# Patient Record
Sex: FEMALE | Race: WHITE | NOT HISPANIC OR LATINO | Employment: FULL TIME | URBAN - METROPOLITAN AREA
[De-identification: names, ages, dates, MRNs, and addresses within clinical notes are randomized per-mention and may not be internally consistent; named-entity substitution may affect disease eponyms.]

---

## 2023-08-09 LAB — TRIGLYCERIDE (MG/DL) IN SER/PLAS: 151 MG/DL (ref 0–149)

## 2023-09-01 LAB
BACTERIA, URINE: ABNORMAL /HPF
RBC, URINE: ABNORMAL /HPF (ref 0–5)
SQUAMOUS EPITHELIAL CELLS, URINE: 16 /HPF
WBC, URINE: ABNORMAL /HPF (ref 0–5)

## 2023-09-03 LAB — URINE CULTURE: ABNORMAL

## 2024-02-12 ASSESSMENT — PATIENT GLOBAL ASSESSMENT (PGA): WHAT IS THE PGA: PATIENT GLOBAL ASSESSMENT:  2 - MILD

## 2024-02-12 ASSESSMENT — DERMATOLOGY QUALITY OF LIFE (QOL) ASSESSMENT
RATE HOW BOTHERED YOU ARE BY EFFECTS OF YOUR SKIN PROBLEMS ON YOUR ACTIVITIES (EG, GOING OUT, ACCOMPLISHING WHAT YOU WANT, WORK ACTIVITIES OR YOUR RELATIONSHIPS WITH OTHERS): 6 - ALWAYS BOTHERED
DATE THE QUALITY-OF-LIFE ASSESSMENT WAS COMPLETED: 66883
RATE HOW BOTHERED YOU ARE BY EFFECTS OF YOUR SKIN PROBLEMS ON YOUR ACTIVITIES (EG, GOING OUT, ACCOMPLISHING WHAT YOU WANT, WORK ACTIVITIES OR YOUR RELATIONSHIPS WITH OTHERS): 6 - ALWAYS BOTHERED
RATE HOW BOTHERED YOU ARE BY SYMPTOMS OF YOUR SKIN PROBLEM (EG, ITCHING, STINGING BURNING, HURTING OR SKIN IRRITATION): 6 - ALWAYS BOTHERED
RATE HOW BOTHERED YOU ARE BY SYMPTOMS OF YOUR SKIN PROBLEM (EG, ITCHING, STINGING BURNING, HURTING OR SKIN IRRITATION): 6 - ALWAYS BOTHERED
WHAT SINGLE SKIN CONDITION LISTED BELOW IS THE PATIENT ANSWERING THE QUALITY-OF-LIFE ASSESSMENT QUESTIONS ABOUT: NONE OF THE ABOVE
WHAT SINGLE SKIN CONDITION LISTED BELOW IS THE PATIENT ANSWERING THE QUALITY-OF-LIFE ASSESSMENT QUESTIONS ABOUT: NONE OF THE ABOVE
RATE HOW EMOTIONALLY BOTHERED YOU ARE BY YOUR SKIN PROBLEM (FOR EXAMPLE, WORRY, EMBARRASSMENT, FRUSTRATION): 6 - ALWAYS BOTHERED
RATE HOW EMOTIONALLY BOTHERED YOU ARE BY YOUR SKIN PROBLEM (FOR EXAMPLE, WORRY, EMBARRASSMENT, FRUSTRATION): 6 - ALWAYS BOTHERED

## 2024-02-13 ENCOUNTER — DOCUMENTATION (OUTPATIENT)
Dept: DERMATOLOGY | Facility: CLINIC | Age: 54
End: 2024-02-13
Payer: COMMERCIAL

## 2024-02-13 NOTE — PROGRESS NOTES
History of Skin cancer  History of Melanoma    1: Mild dysplastic nevus  Location: Right Lower Leg  Biopsy Date: August 9, 2023  Pathology:  [id number]  Treatment:  Monitor for recurrence

## 2024-02-14 ENCOUNTER — OFFICE VISIT (OUTPATIENT)
Dept: DERMATOLOGY | Facility: CLINIC | Age: 54
End: 2024-02-14
Payer: COMMERCIAL

## 2024-02-14 DIAGNOSIS — Z85.820 PERSONAL HISTORY OF MALIGNANT MELANOMA OF SKIN: ICD-10-CM

## 2024-02-14 DIAGNOSIS — L81.4 LENTIGO: ICD-10-CM

## 2024-02-14 DIAGNOSIS — Q82.8 HAILEY-HAILEY DISEASE: Primary | ICD-10-CM

## 2024-02-14 DIAGNOSIS — L82.1 SEBORRHEIC KERATOSIS: ICD-10-CM

## 2024-02-14 DIAGNOSIS — L90.5 SCAR CONDITIONS AND FIBROSIS OF SKIN: ICD-10-CM

## 2024-02-14 DIAGNOSIS — D22.9 MELANOCYTIC NEVUS, UNSPECIFIED LOCATION: ICD-10-CM

## 2024-02-14 DIAGNOSIS — D18.01 HEMANGIOMA OF SKIN: ICD-10-CM

## 2024-02-14 PROCEDURE — 99213 OFFICE O/P EST LOW 20 MIN: CPT | Performed by: DERMATOLOGY

## 2024-02-14 RX ORDER — ACYCLOVIR 800 MG/1
800 TABLET ORAL
COMMUNITY
Start: 2019-11-01 | End: 2024-02-14 | Stop reason: SDUPTHER

## 2024-02-14 RX ORDER — ACYCLOVIR 800 MG/1
800 TABLET ORAL 2 TIMES DAILY
Qty: 60 TABLET | Refills: 11 | Status: SHIPPED | OUTPATIENT
Start: 2024-02-14

## 2024-02-14 RX ORDER — CLOBETASOL PROPIONATE 0.5 MG/G
OINTMENT TOPICAL
COMMUNITY
Start: 2022-08-30 | End: 2024-02-14 | Stop reason: SDUPTHER

## 2024-02-14 RX ORDER — CLOBETASOL PROPIONATE 0.46 MG/ML
SOLUTION TOPICAL
COMMUNITY
Start: 2021-09-08 | End: 2024-02-14 | Stop reason: SDUPTHER

## 2024-02-14 RX ORDER — CLOBETASOL PROPIONATE 0.46 MG/ML
SOLUTION TOPICAL
Qty: 50 ML | Refills: 11 | Status: SHIPPED | OUTPATIENT
Start: 2024-02-14

## 2024-02-14 RX ORDER — CHLORHEXIDINE GLUCONATE 40 MG/ML
SOLUTION TOPICAL
COMMUNITY

## 2024-02-14 RX ORDER — ACITRETIN 25 MG/1
CAPSULE ORAL
COMMUNITY
End: 2024-02-14 | Stop reason: SDUPTHER

## 2024-02-14 RX ORDER — ACITRETIN 25 MG/1
CAPSULE ORAL
Qty: 30 CAPSULE | Refills: 11 | Status: SHIPPED | OUTPATIENT
Start: 2024-02-14

## 2024-02-14 RX ORDER — CLOBETASOL PROPIONATE 0.5 MG/G
OINTMENT TOPICAL
Qty: 60 G | Refills: 11 | Status: SHIPPED | OUTPATIENT
Start: 2024-02-14

## 2024-02-14 NOTE — PROGRESS NOTES
Subjective   Shelly Escobar is a 53 y.o. female who presents for the following: Skin Check.  Skin Cancer Screening  She has a history of minimal sun exposure. She is in the sun infrequently. She uses sunscreen daily. She reports itching. Her moles are not changing.    Spots that concern her: posterior neck (blistering, week, clobetasol ointment)  History of Skin cancer  History of Melanoma     1: Mild dysplastic nevus  Location: Right Lower Leg  Biopsy Date: August 9, 2023  Pathology:  [id number]  Treatment:  Monitor for recurrence      Objective   Well appearing patient in no apparent distress; mood and affect are within normal limits.    A full examination was performed including scalp, head, eyes, ears, nose, lips, neck, chest, axillae, abdomen, back, buttocks, bilateral upper extremities, bilateral lower extremities, hands, feet, fingers, toes, fingernails, and toenails. All findings within normal limits unless otherwise noted below.    Patient is clear on exam.       Scattered cherry-red papule(s).    All nevi were symmetric brown macules without atypia on dermoscopy.     Scattered tan macules in sun-exposed areas.    Stuck on verrucous, tan-brown papules and plaques.      Scar is well-healed without evidence of recurrence      Assessment/Plan   Manoj-manoj disease    Pt is well controlled on acitretin 25 mg daily; labs have been stable per PCP. Will continue clobetasol PRN, acyclovir, and acitretin. Pt will call with issues. Pt agrees with plan as above.       Related Medications  clobetasol (Temovate) 0.05 % ointment  1 Application twice daily x 2 weeks then as needed for flares    clobetasol (Temovate) 0.05 % external solution  3-4 drops to affected ear BID x 7 days, then use as needed for itching for 3 days    acitretin (Soriatane) 25 mg capsule  Take 1 pill by mouth daily with food.    acyclovir (Zovirax) 800 mg tablet  Take 1 tablet (800 mg) by mouth 2 times a day. For prophylaxis    Hemangioma of  skin    Melanocytic nevus, unspecified location    The ABCDEs of melanoma and warning signs of non-melanoma skin cancer were discussed with patient and patient expressed understanding. Sun protection and use of at least SPF 30 discussed with patient. Pt instructed to reapply every 2 hours.     Lentigo    The ABCDEs of melanoma and warning signs of non-melanoma skin cancer were discussed with patient and patient expressed understanding. Sun protection and use of at least SPF 30 discussed with patient. Pt instructed to reapply every 2 hours.     Seborrheic keratosis    Scar conditions and fibrosis of skin    Personal history of malignant melanoma of skin      Follow up in 1 year or sooner as needed.

## 2024-05-20 ENCOUNTER — SPECIALTY PHARMACY (OUTPATIENT)
Dept: PHARMACY | Facility: CLINIC | Age: 54
End: 2024-05-20

## 2024-06-04 ENCOUNTER — TELEPHONE (OUTPATIENT)
Dept: DERMATOLOGY | Facility: CLINIC | Age: 54
End: 2024-06-04
Payer: COMMERCIAL

## 2024-06-04 NOTE — TELEPHONE ENCOUNTER
Patient called and was concerned regarding denial letter of 5/20/24 from new insurance regarding medication Acitretin 25mg.  I informed patient that Smita Llanos MA was requested by Dr. Shah to ask [facility] ( [facility]) to assist us in resubmitting for this medication.

## 2024-06-07 ENCOUNTER — DOCUMENTATION (OUTPATIENT)
Dept: DERMATOLOGY | Facility: CLINIC | Age: 54
End: 2024-06-07
Payer: COMMERCIAL

## 2024-06-07 NOTE — PROGRESS NOTES
Per Inez Whitney with [facility], acitretin appeal approved until 5/21/2025. Patient to fill with Nicolasa Osborne.

## 2024-07-22 ENCOUNTER — APPOINTMENT (OUTPATIENT)
Dept: URBAN - METROPOLITAN AREA CLINIC 204 | Age: 54
Setting detail: DERMATOLOGY
End: 2024-07-23

## 2024-07-22 PROCEDURE — 99203 OFFICE O/P NEW LOW 30 MIN: CPT

## 2024-07-22 PROCEDURE — OTHER MIPS QUALITY: OTHER

## 2024-09-06 ENCOUNTER — OFFICE VISIT (OUTPATIENT)
Dept: PRIMARY CARE | Facility: CLINIC | Age: 54
End: 2024-09-06
Payer: COMMERCIAL

## 2024-09-06 VITALS
HEART RATE: 92 BPM | BODY MASS INDEX: 25.43 KG/M2 | TEMPERATURE: 98 F | WEIGHT: 162 LBS | RESPIRATION RATE: 16 BRPM | DIASTOLIC BLOOD PRESSURE: 70 MMHG | OXYGEN SATURATION: 99 % | SYSTOLIC BLOOD PRESSURE: 110 MMHG | HEIGHT: 67 IN

## 2024-09-06 DIAGNOSIS — B95.0 GROUP A STREPTOCOCCAL INFECTION: Primary | ICD-10-CM

## 2024-09-06 PROBLEM — Q82.8 DARIER DISEASE: Status: ACTIVE | Noted: 2024-09-06

## 2024-09-06 LAB — POC RAPID STREP: POSITIVE

## 2024-09-06 PROCEDURE — 99214 OFFICE O/P EST MOD 30 MIN: CPT

## 2024-09-06 PROCEDURE — 3008F BODY MASS INDEX DOCD: CPT

## 2024-09-06 PROCEDURE — 87880 STREP A ASSAY W/OPTIC: CPT

## 2024-09-06 RX ORDER — AMOXICILLIN 875 MG/1
875 TABLET, FILM COATED ORAL 2 TIMES DAILY
Qty: 14 TABLET | Refills: 0 | Status: SHIPPED | OUTPATIENT
Start: 2024-09-06 | End: 2024-09-13

## 2024-09-06 RX ORDER — CHLORHEXIDINE GLUCONATE 40 MG/ML
SOLUTION TOPICAL
Qty: 3785 ML | Refills: 3 | Status: SHIPPED | OUTPATIENT
Start: 2024-09-06 | End: 2024-10-06

## 2024-09-06 NOTE — PROGRESS NOTES
"Subjective   Shelly Escobar is a 53 y.o. female who presents for Sore Throat (6 days).  Currently has an outbreak of herpes zoster on L flank and vaginal area. Has not had 2nd dose of shingles vaccine. States that she was hospitalized after 1st dose.     Sore throat: Started on Sunday morning, has swollen lymph nodes on the neck, has negative home covid test, sore throat has been stable since (not improving or worsening). States that she regularly rinses her         Objective     /70 (BP Location: Left arm, Patient Position: Sitting, BP Cuff Size: Adult)   Pulse 92   Temp 36.7 °C (98 °F)   Resp 16   Ht 1.702 m (5' 7\")   Wt 73.5 kg (162 lb)   SpO2 99%   BMI 25.37 kg/m²   Physical Exam  Constitutional:       General: She is not in acute distress.     Appearance: Normal appearance. She is not ill-appearing.   HENT:      Head: Normocephalic and atraumatic.      Mouth/Throat:      Mouth: Mucous membranes are moist.      Pharynx: Oropharynx is clear. No oropharyngeal exudate.      Comments: Uvula is midline.  There is no evidence of peritonsillar abscess.  There is no obvious tonsillitis.  Tonsils are symmetric and enlarged bilaterally, this is likely her baseline.  There is no posterior pharyngeal stippling.  There is no obvious signs of herpetic stomatitis such as punched-out ulcers or vesicles.  Eyes:      Extraocular Movements: Extraocular movements intact.   Neck:      Comments: There is 1 large palpable lymph node in the right anterior neck as well as a similar 1 on the left side.  Musculoskeletal:         General: Normal range of motion.   Lymphadenopathy:      Cervical: Cervical adenopathy present.   Skin:     General: Skin is warm and dry.   Neurological:      General: No focal deficit present.      Mental Status: She is alert.   Psychiatric:         Mood and Affect: Mood normal.         Thought Content: Thought content normal.         Assessment & Plan  Group A streptococcal infection  Given her " history of repeat streptococcal pharyngitis and positive rapid strep test performed in office today, will treat with amoxicillin X 7 days.  Advised her to follow-up if symptoms worsen or are not improving.  Orders:    POCT Rapid Strep A manually resulted    amoxicillin (Amoxil) 875 mg tablet; Take 1 tablet (875 mg) by mouth 2 times a day for 7 days.             Xavi Krishna D.O.   Family Medicine PGY-2, [facility]  09/06/24

## 2024-09-06 NOTE — PATIENT INSTRUCTIONS
It was nice seeing you in the office today.  Sign up for [employer] to get results instantly.  Obtain labs/imaging as discussed during our visit.   Follow up as needed.   Call the office: [phone] for questions or concerns.  Please allow 48-72 hours for medication refills.    Xavi Krishna D.O.   Family Medicine PGY-2, [facility]  09/06/24

## 2024-09-09 ENCOUNTER — TELEPHONE (OUTPATIENT)
Dept: PRIMARY CARE | Facility: CLINIC | Age: 54
End: 2024-09-09
Payer: COMMERCIAL

## 2024-09-11 DIAGNOSIS — B95.0 GROUP A STREPTOCOCCAL INFECTION: ICD-10-CM

## 2024-09-20 ENCOUNTER — TELEPHONE (OUTPATIENT)
Dept: PRIMARY CARE | Facility: CLINIC | Age: 54
End: 2024-09-20
Payer: COMMERCIAL

## 2024-09-27 DIAGNOSIS — B95.0 GROUP A STREPTOCOCCAL INFECTION: ICD-10-CM

## 2024-09-27 DIAGNOSIS — Q82.8 HAILEY-HAILEY DISEASE: ICD-10-CM

## 2024-09-27 RX ORDER — CHLORHEXIDINE GLUCONATE 40 MG/ML
SOLUTION TOPICAL
Qty: 946 ML | Refills: 3 | Status: SHIPPED | OUTPATIENT
Start: 2024-09-27 | End: 2024-10-27

## 2024-10-01 ENCOUNTER — TELEPHONE (OUTPATIENT)
Dept: PRIMARY CARE | Facility: CLINIC | Age: 54
End: 2024-10-01
Payer: COMMERCIAL

## 2024-10-01 DIAGNOSIS — B95.0 GROUP A STREPTOCOCCAL INFECTION: Primary | ICD-10-CM

## 2024-10-01 RX ORDER — CHLORHEXIDINE GLUCONATE ORAL RINSE 1.2 MG/ML
15 SOLUTION DENTAL AS NEEDED
Qty: 120 ML | Refills: 0 | Status: SHIPPED | OUTPATIENT
Start: 2024-10-01 | End: 2024-10-15

## 2024-11-02 ENCOUNTER — OFFICE VISIT (OUTPATIENT)
Dept: URGENT CARE | Age: 54
End: 2024-11-02
Payer: COMMERCIAL

## 2024-11-02 VITALS
OXYGEN SATURATION: 96 % | DIASTOLIC BLOOD PRESSURE: 75 MMHG | SYSTOLIC BLOOD PRESSURE: 120 MMHG | RESPIRATION RATE: 16 BRPM | HEART RATE: 64 BPM

## 2024-11-02 DIAGNOSIS — Z20.2 STD EXPOSURE: Primary | ICD-10-CM

## 2024-11-02 LAB
POC BILIRUBIN, URINE: NEGATIVE
POC BLOOD, URINE: NEGATIVE
POC GLUCOSE, URINE: NEGATIVE MG/DL
POC KETONES, URINE: NEGATIVE MG/DL
POC LEUKOCYTES, URINE: NEGATIVE
POC NITRITE,URINE: NEGATIVE
POC PH, URINE: 6 PH
POC PROTEIN, URINE: NEGATIVE MG/DL
POC SPECIFIC GRAVITY, URINE: 1.02
POC UROBILINOGEN, URINE: 0.2 EU/DL
PREGNANCY TEST URINE, POC: NEGATIVE

## 2024-11-02 PROCEDURE — 87661 TRICHOMONAS VAGINALIS AMPLIF: CPT

## 2024-11-02 PROCEDURE — 87491 CHLMYD TRACH DNA AMP PROBE: CPT

## 2024-11-02 PROCEDURE — 87591 N.GONORRHOEAE DNA AMP PROB: CPT

## 2024-11-03 LAB
C TRACH RRNA SPEC QL NAA+PROBE: NEGATIVE
N GONORRHOEA DNA SPEC QL PROBE+SIG AMP: NEGATIVE
T VAGINALIS RRNA SPEC QL NAA+PROBE: NEGATIVE

## 2024-11-20 ENCOUNTER — OFFICE VISIT (OUTPATIENT)
Dept: PRIMARY CARE | Facility: CLINIC | Age: 54
End: 2024-11-20
Payer: COMMERCIAL

## 2024-11-20 ENCOUNTER — HOSPITAL ENCOUNTER (OUTPATIENT)
Dept: RADIOLOGY | Facility: CLINIC | Age: 54
Discharge: HOME | End: 2024-11-20
Payer: COMMERCIAL

## 2024-11-20 VITALS
TEMPERATURE: 97.2 F | DIASTOLIC BLOOD PRESSURE: 78 MMHG | BODY MASS INDEX: 25.76 KG/M2 | WEIGHT: 164.5 LBS | SYSTOLIC BLOOD PRESSURE: 115 MMHG | HEART RATE: 76 BPM | RESPIRATION RATE: 16 BRPM | OXYGEN SATURATION: 97 %

## 2024-11-20 DIAGNOSIS — J40 BRONCHITIS: Primary | ICD-10-CM

## 2024-11-20 DIAGNOSIS — J40 BRONCHITIS: ICD-10-CM

## 2024-11-20 PROBLEM — M81.0 OSTEOPOROSIS: Status: ACTIVE | Noted: 2024-11-20

## 2024-11-20 PROBLEM — N32.81 OAB (OVERACTIVE BLADDER): Status: ACTIVE | Noted: 2024-11-20

## 2024-11-20 PROBLEM — N81.6 HERNIATION OF RECTUM INTO VAGINA: Status: ACTIVE | Noted: 2024-11-20

## 2024-11-20 PROBLEM — J45.909 AIRWAY HYPERREACTIVITY (HHS-HCC): Status: ACTIVE | Noted: 2024-11-20

## 2024-11-20 PROCEDURE — 71046 X-RAY EXAM CHEST 2 VIEWS: CPT | Performed by: RADIOLOGY

## 2024-11-20 PROCEDURE — 99214 OFFICE O/P EST MOD 30 MIN: CPT

## 2024-11-20 PROCEDURE — 71046 X-RAY EXAM CHEST 2 VIEWS: CPT

## 2024-11-20 RX ORDER — PREDNISONE 20 MG/1
20 TABLET ORAL DAILY
Qty: 7 TABLET | Refills: 0 | Status: SHIPPED | OUTPATIENT
Start: 2024-11-20 | End: 2024-11-27

## 2024-11-20 RX ORDER — AZITHROMYCIN 250 MG/1
TABLET, FILM COATED ORAL
Qty: 6 TABLET | Refills: 0 | Status: SHIPPED | OUTPATIENT
Start: 2024-11-20 | End: 2024-11-25

## 2024-11-20 NOTE — PATIENT INSTRUCTIONS
It was nice seeing you in the office today.  Sign up for [employer] to get results instantly.  Obtain labs/imaging as discussed during our visit.   Follow up 3 months for 2025 annual.   Call the office: [phone] for questions or concerns.  Please allow 48-72 hours for medication refills.    Xavi Krishna D.O.   Family Medicine PGY-2, [facility]  11/20/24

## 2024-11-20 NOTE — PROGRESS NOTES
Subjective   Shelly Escobar is a 53 y.o. female who presents for Bronchitis (Pt here today for bronchitis. Pt stated that she's had for about a month and is requesting a CXR. Pt also stated that she is light-headed as well.).  Started about 1 month ago with a bad cough. No other Sx, but persistent. Worst at night. No fevers or chills. Was seen at urgent care once without significant intervention or CXR.     Still able to drink and swallow, but has a lot of coughing after.     Has known Hx of multiple bouts of PNA. She reports about 20 or so throughout her life, some of which required hospitalization.         Objective     /78 (BP Location: Right arm, Patient Position: Sitting)   Pulse 76   Temp 36.2 °C (97.2 °F) (Temporal)   Resp 16   Wt 74.6 kg (164 lb 8 oz)   SpO2 97%   BMI 25.76 kg/m²   Physical Exam  Constitutional:       Appearance: Normal appearance.   HENT:      Head: Normocephalic and atraumatic.   Eyes:      Extraocular Movements: Extraocular movements intact.   Cardiovascular:      Rate and Rhythm: Normal rate and regular rhythm.   Pulmonary:      Effort: Pulmonary effort is normal. No respiratory distress.      Breath sounds: Normal breath sounds.   Skin:     General: Skin is warm and dry.   Neurological:      General: No focal deficit present.      Mental Status: She is alert. Mental status is at baseline.   Psychiatric:         Mood and Affect: Mood normal.         Thought Content: Thought content normal.         Assessment & Plan  Bronchitis  -See history above, longstanding history of multiple bouts of pneumonia  -No reported asthma or COPD or other lung issues  -This cough is been ongoing for about 4 to 5 weeks, subacute  -I suspect that all of her symptoms may be due to a subacute bronchitis.  However, given her history and the rising prevalence of walking pneumonia currently, will treat empirically with azithromycin and obtain chest x-ray.  -Will also treat empirically for bronchitis  with 20 mg prednisone x 7 days  -ER precautions and return precautions discussed.  May broaden antibiotic therapy based on x-ray results  Orders:    azithromycin (Zithromax) 250 mg tablet; Take 2 tablets (500 mg) by mouth once daily for 1 day, THEN 1 tablet (250 mg) once daily for 4 days. Take 2 tabs (500 mg) by mouth today, than 1 daily for 4 days..    predniSONE (Deltasone) 20 mg tablet; Take 1 tablet (20 mg) by mouth once daily for 7 days.    Follow Up In [facility] - PCP - Established; Future    XR chest 2 views; Future      Fu 3 months for 2025 annual or sooner if needed.         Xavi Krishna D.O.   Family Medicine PGY-2, [facility]  11/20/24

## 2024-11-20 NOTE — PROGRESS NOTES
I reviewed with the resident the medical history and the resident’s findings on physical examination.  I discussed with the resident the patient’s diagnosis and concur with the treatment plan as documented in the resident note.     Vidal Mendenhall, DO

## 2024-12-10 ENCOUNTER — OFFICE VISIT (OUTPATIENT)
Dept: PRIMARY CARE | Facility: CLINIC | Age: 54
End: 2024-12-10
Payer: COMMERCIAL

## 2024-12-10 ENCOUNTER — HOSPITAL ENCOUNTER (EMERGENCY)
Facility: HOSPITAL | Age: 54
Discharge: ANOTHER HOSPITAL | End: 2024-12-10
Attending: EMERGENCY MEDICINE
Payer: COMMERCIAL

## 2024-12-10 ENCOUNTER — HOSPITAL ENCOUNTER (EMERGENCY)
Facility: HOSPITAL | Age: 54
Discharge: HOME | End: 2024-12-10
Attending: EMERGENCY MEDICINE
Payer: COMMERCIAL

## 2024-12-10 VITALS
HEART RATE: 71 BPM | HEIGHT: 66 IN | DIASTOLIC BLOOD PRESSURE: 81 MMHG | RESPIRATION RATE: 18 BRPM | OXYGEN SATURATION: 98 % | SYSTOLIC BLOOD PRESSURE: 121 MMHG | TEMPERATURE: 97.9 F | BODY MASS INDEX: 26.26 KG/M2 | WEIGHT: 163.4 LBS

## 2024-12-10 VITALS
DIASTOLIC BLOOD PRESSURE: 80 MMHG | BODY MASS INDEX: 25.55 KG/M2 | HEART RATE: 86 BPM | OXYGEN SATURATION: 100 % | TEMPERATURE: 96.8 F | WEIGHT: 159 LBS | SYSTOLIC BLOOD PRESSURE: 121 MMHG | HEIGHT: 66 IN | RESPIRATION RATE: 18 BRPM

## 2024-12-10 VITALS
DIASTOLIC BLOOD PRESSURE: 81 MMHG | HEIGHT: 66 IN | RESPIRATION RATE: 16 BRPM | SYSTOLIC BLOOD PRESSURE: 115 MMHG | BODY MASS INDEX: 26.26 KG/M2 | HEART RATE: 85 BPM | WEIGHT: 163.4 LBS | TEMPERATURE: 98 F | OXYGEN SATURATION: 93 %

## 2024-12-10 DIAGNOSIS — B02.30 ZOSTER OCULAR DISEASE, UNSPECIFIED: ICD-10-CM

## 2024-12-10 DIAGNOSIS — H10.33 ACUTE CONJUNCTIVITIS OF BOTH EYES, UNSPECIFIED ACUTE CONJUNCTIVITIS TYPE: Primary | ICD-10-CM

## 2024-12-10 DIAGNOSIS — H57.89 REDNESS OF BOTH EYES: Primary | ICD-10-CM

## 2024-12-10 DIAGNOSIS — H57.89 SCLERAL INJECTION: Primary | ICD-10-CM

## 2024-12-10 DIAGNOSIS — H57.13 PAIN OF BOTH EYES: ICD-10-CM

## 2024-12-10 PROCEDURE — 99284 EMERGENCY DEPT VISIT MOD MDM: CPT | Performed by: EMERGENCY MEDICINE

## 2024-12-10 PROCEDURE — 3008F BODY MASS INDEX DOCD: CPT

## 2024-12-10 PROCEDURE — 99214 OFFICE O/P EST MOD 30 MIN: CPT

## 2024-12-10 PROCEDURE — 2500000005 HC RX 250 GENERAL PHARMACY W/O HCPCS

## 2024-12-10 PROCEDURE — 99285 EMERGENCY DEPT VISIT HI MDM: CPT | Performed by: EMERGENCY MEDICINE

## 2024-12-10 PROCEDURE — 99283 EMERGENCY DEPT VISIT LOW MDM: CPT | Performed by: EMERGENCY MEDICINE

## 2024-12-10 RX ORDER — TETRACAINE HYDROCHLORIDE 5 MG/ML
1 SOLUTION OPHTHALMIC ONCE
Status: COMPLETED | OUTPATIENT
Start: 2024-12-10 | End: 2024-12-10

## 2024-12-10 RX ORDER — MOXIFLOXACIN 5 MG/ML
1 SOLUTION/ DROPS OPHTHALMIC 4 TIMES DAILY
Qty: 3 ML | Refills: 0 | Status: SHIPPED | OUTPATIENT
Start: 2024-12-10 | End: 2024-12-15

## 2024-12-10 ASSESSMENT — LIFESTYLE VARIABLES
HAVE PEOPLE ANNOYED YOU BY CRITICIZING YOUR DRINKING: NO
HAVE YOU EVER FELT YOU SHOULD CUT DOWN ON YOUR DRINKING: NO
EVER HAD A DRINK FIRST THING IN THE MORNING TO STEADY YOUR NERVES TO GET RID OF A HANGOVER: NO
EVER FELT BAD OR GUILTY ABOUT YOUR DRINKING: NO
TOTAL SCORE: 0

## 2024-12-10 ASSESSMENT — PAIN SCALES - GENERAL
PAINLEVEL_OUTOF10: 8
PAINLEVEL_OUTOF10: 6
PAINLEVEL_OUTOF10: 8
PAINLEVEL_OUTOF10: 8

## 2024-12-10 ASSESSMENT — ENCOUNTER SYMPTOMS
UNEXPECTED WEIGHT CHANGE: 0
PHOTOPHOBIA: 0
RHINORRHEA: 0
SORE THROAT: 0
NUMBNESS: 0
WHEEZING: 0
TREMORS: 0
ACTIVITY CHANGE: 0
EYE PAIN: 1
HEADACHES: 0
PALPITATIONS: 0
ARTHRALGIAS: 0
DIARRHEA: 0
CHEST TIGHTNESS: 0
WOUND: 0
APPETITE CHANGE: 0
NECK STIFFNESS: 0
SINUS PAIN: 0
CHILLS: 0
ABDOMINAL DISTENTION: 0
NECK PAIN: 0
VOMITING: 0
SEIZURES: 0
CONSTIPATION: 0
MYALGIAS: 0
EYE ITCHING: 1
COUGH: 0
BACK PAIN: 0
LIGHT-HEADEDNESS: 0
FEVER: 0
EYE REDNESS: 1
STRIDOR: 0
ABDOMINAL PAIN: 0
SINUS PRESSURE: 0
NAUSEA: 0
FATIGUE: 0
DYSURIA: 0
HEMATURIA: 0
FACIAL SWELLING: 0
FREQUENCY: 0
SHORTNESS OF BREATH: 0
DIZZINESS: 0

## 2024-12-10 ASSESSMENT — VISUAL ACUITY
OU: 20/20
OS: 20/25
OU: 1
OD: 20/20
OU: 20/50
OD: 20/50
OS: 20/70

## 2024-12-10 ASSESSMENT — PAIN DESCRIPTION - LOCATION
LOCATION: EYE
LOCATION: EYE

## 2024-12-10 ASSESSMENT — COLUMBIA-SUICIDE SEVERITY RATING SCALE - C-SSRS
2. HAVE YOU ACTUALLY HAD ANY THOUGHTS OF KILLING YOURSELF?: NO
2. HAVE YOU ACTUALLY HAD ANY THOUGHTS OF KILLING YOURSELF?: NO
6. HAVE YOU EVER DONE ANYTHING, STARTED TO DO ANYTHING, OR PREPARED TO DO ANYTHING TO END YOUR LIFE?: NO
1. IN THE PAST MONTH, HAVE YOU WISHED YOU WERE DEAD OR WISHED YOU COULD GO TO SLEEP AND NOT WAKE UP?: NO
6. HAVE YOU EVER DONE ANYTHING, STARTED TO DO ANYTHING, OR PREPARED TO DO ANYTHING TO END YOUR LIFE?: NO
1. IN THE PAST MONTH, HAVE YOU WISHED YOU WERE DEAD OR WISHED YOU COULD GO TO SLEEP AND NOT WAKE UP?: NO

## 2024-12-10 ASSESSMENT — PAIN DESCRIPTION - PAIN TYPE
TYPE: ACUTE PAIN
TYPE: ACUTE PAIN

## 2024-12-10 ASSESSMENT — PAIN DESCRIPTION - ORIENTATION
ORIENTATION: RIGHT;LEFT
ORIENTATION: RIGHT;LEFT

## 2024-12-10 ASSESSMENT — PAIN DESCRIPTION - DESCRIPTORS: DESCRIPTORS: DISCOMFORT

## 2024-12-10 ASSESSMENT — PAIN - FUNCTIONAL ASSESSMENT
PAIN_FUNCTIONAL_ASSESSMENT: 0-10
PAIN_FUNCTIONAL_ASSESSMENT: 0-10

## 2024-12-10 ASSESSMENT — PAIN DESCRIPTION - PROGRESSION: CLINICAL_PROGRESSION: NOT CHANGED

## 2024-12-10 NOTE — ED PROVIDER NOTES
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  Patient has had herpes zoster abdominis ophthalmicus in the past and reports her symptoms feel exactly the same.  She is having bilateral eye redness and pain since Saturday.  Symptoms are getting progressively worse.  Patient reports her vision is starting to worsen.  She is having bilateral blurry vision.  Visual acuity at [place] was 20/20.  Thompson lamp exam showed no corneal abrasions negative Sascha signs.  Eye pressure at [place] was 16 to the right eye and 15 to the left eye.  Denies fever, chills, nausea, vomiting, shortness of breath.  Denies lesions around body.      Patient History   Past Medical History:   Diagnosis Date    Personal history of (healed) traumatic fracture 2022    History of fracture of vertebra    Personal history of malignant melanoma of skin 2022    History of malignant melanoma    Personal history of malignant neoplasm of breast 2022    History of malignant neoplasm of breast     Past Surgical History:   Procedure Laterality Date    OTHER SURGICAL HISTORY  2019    Hysterectomy    OTHER SURGICAL HISTORY  2019    Vaginal sling procedure    OTHER SURGICAL HISTORY  2022    Lumpectomy    OTHER SURGICAL HISTORY  2019    Rectocele repair     No family history on file.  Social History     Tobacco Use    Smoking status: Former     Current packs/day: 0.00     Types: Cigarettes     Quit date: 2024     Years since quittin.4    Smokeless tobacco: Never   Vaping Use    Vaping status: Never Used   Substance Use Topics    Alcohol use: Not on file    Drug use: Never       Physical Exam   ED Triage Vitals [12/10/24 1748]   Temperature Heart Rate  Respirations BP   36 °C (96.8 °F) 86 18 121/80      Pulse Ox Temp src Heart Rate Source Patient Position   100 % -- -- Sitting      BP Location FiO2 (%)     Left arm --       Physical Exam  Vitals reviewed.   Constitutional:       General: She is not in acute distress.     Appearance: Normal appearance. She is not ill-appearing.   HENT:      Head: Normocephalic and atraumatic.      Nose: No congestion or rhinorrhea.   Eyes:      General: Vision grossly intact. Gaze aligned appropriately.         Right eye: No foreign body, discharge or hordeolum.         Left eye: No foreign body, discharge or hordeolum.      Extraocular Movements: Extraocular movements intact.      Right eye: No nystagmus.      Left eye: No nystagmus.      Conjunctiva/sclera:      Right eye: Right conjunctiva is injected. No chemosis, exudate or hemorrhage.     Left eye: Left conjunctiva is injected. No chemosis, exudate or hemorrhage.     Pupils: Pupils are equal, round, and reactive to light.   Cardiovascular:      Rate and Rhythm: Normal rate.      Pulses: Normal pulses.   Pulmonary:      Effort: Pulmonary effort is normal.   Skin:     General: Skin is warm and dry.      Capillary Refill: Capillary refill takes less than 2 seconds.      Coloration: Skin is not jaundiced or pale.      Findings: No lesion or rash.   Neurological:      General: No focal deficit present.      Mental Status: She is alert and oriented to person, place, and time.   Psychiatric:         Mood and Affect: Mood normal.         Behavior: Behavior normal.           ED Course & MDM   ED Course as of 12/10/24 2032   Tue Dec 10, 2024   1850 Ophthalmology is aware of patient. [HK]   1850 Visual acuity is 20/50 bilaterally [HK]   2027 Ophthalmology recommendations to discharge patient on artificial tears and Vigamox with follow-up in their clinic.  No concern for herpes ophthalmicus.   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  On exam patient is well-appearing and in no acute distress.  Vital signs are stable. Physical exam significant for bilateral eye redness.  PERRLA.  EOM intact.  Visual acuity grossly intact.  20/20 at [place] and 20/50 on her evaluation here.  Exam is subjective therefore will await ophthalmology exam before diagnosing worsening vision.  Negative Valero sign. Differential includes but is not limited to conjunctivitis, herpes zoster ophthalmicus, dry eye, optic neuritis. Ophthalmology was consulted immediately after my exam.  Patient staffed with ED attending physician.     Social determinants of health affecting care:  None     Patient was informed of the aforementioned findings.  Ophthalmology reported low suspicion for herpes zoster ophthalmicus or ocular inflammation based on their exam.  They recommended patient to be discharged and follow-up with their clinic. Patient will be prescribed Vigamox and artificial tears per ophtho recommendations.     At this time, low suspicion for an acute process that would necessitate further emergent workup, urgent specialist consultation, or hospitalization.  Based on clinical workup and discussion with attending physician, the disposition of discharge is appropriate.  Advised patient to pursue close follow-up with PCP.  Patient was provided with appropriate information to assist in obtaining the proper follow-up.  Discussed strict return to ED protocols with patient, including low threshold to return for changing/worsening symptoms.  Patient  demonstrated understanding and agreement with the plan of care, and all questions answered prior to discharge.  Patient stable at time of discharge.     --------------------------------------------------------------------------------------------------------------------------------------------------------------------------------------------------------------------------    This note was dictated using a speech recognition program.  While an attempt was made at proof reading to minimize errors, minor errors in transcription may be present call for questions.     Procedure  Procedures     Rita Aragon PA-C  12/10/24 2032       Rita Aragon PA-C  12/10/24 2033

## 2024-12-10 NOTE — DISCHARGE INSTRUCTIONS
Go to [facility] emergency room, let them know you are excepted as a transfer for ophthalmology evaluation.  They are aware that you are riding.

## 2024-12-10 NOTE — PROGRESS NOTES
Subjective   Patient ID: Shelly Escobar is a 53 y.o. female who presents for Allergic Reaction (Both eyes ).    Patient reports bilateral eye redness, pruritus, and pain for the past 4 days.  She has tried Benadryl and allergy eyedrops without success.  Patient states this feels similar to zoster ophthalmicus which she was previously hospitalized for several years ago.  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  She reports associated headache and nausea, but denies fever/chills, vomiting, diarrhea, cough, or any other sick symptom.  She did take an increased treatment dose of acyclovir beginning yesterday as is her normal protocol, however this has not made any difference.  Also of note, patient has been ill over the past month or so, first with strep pharyngitis and then secondly with bronchitis.      Review of Systems   Constitutional:  Negative for activity change, appetite change, chills, fatigue, fever and unexpected weight change.   HENT:  Negative for dental problem, ear pain, facial swelling, rhinorrhea, sinus pressure, sinus pain, sneezing, sore throat and tinnitus.    Eyes:  Positive for pain, redness and itching. Negative for photophobia and visual disturbance.   Respiratory:  Negative for cough, chest tightness, shortness of breath, wheezing and stridor.    Cardiovascular:  Negative for chest pain, palpitations and leg swelling.   Gastrointestinal:  Negative for abdominal distention, abdominal pain, constipation, diarrhea, nausea and vomiting.   Genitourinary:  Negative for decreased urine volume, dysuria, enuresis, frequency, hematuria and urgency.   Musculoskeletal:  Negative for arthralgias, back pain, myalgias, neck pain and neck stiffness.   Skin:  Negative for pallor, rash and wound.   Neurological:  Negative for dizziness, tremors, seizures, light-headedness, numbness and headaches.       Objective   /81 (BP Location: Right arm, Patient Position: Sitting,  "BP Cuff Size: Large adult)   Pulse 85   Temp 36.7 °C (98 °F) (Tympanic)   Resp 16   Ht 1.676 m (5' 6\")   Wt 74.1 kg (163 lb 6.4 oz)   SpO2 93%   BMI 26.37 kg/m²     Physical Exam  Vitals and nursing note reviewed.   Constitutional:       General: She is not in acute distress.     Appearance: Normal appearance. She is not ill-appearing or toxic-appearing.   HENT:      Head: Normocephalic and atraumatic.      Right Ear: External ear normal.      Left Ear: External ear normal.      Nose: Nose normal.      Mouth/Throat:      Mouth: Mucous membranes are moist.   Eyes:      General: Lids are normal.      Extraocular Movements: Extraocular movements intact.      Conjunctiva/sclera:      Right eye: Right conjunctiva is injected. No exudate or hemorrhage.     Left eye: Left conjunctiva is injected. No exudate or hemorrhage.  Cardiovascular:      Rate and Rhythm: Normal rate and regular rhythm.   Pulmonary:      Effort: Pulmonary effort is normal. No respiratory distress.      Breath sounds: Normal breath sounds. No stridor. No wheezing or rhonchi.   Abdominal:      General: Abdomen is flat.      Tenderness: There is no abdominal tenderness. There is no guarding or rebound.   Musculoskeletal:         General: Normal range of motion.   Skin:     General: Skin is warm and dry.      Findings: No rash.   Neurological:      General: No focal deficit present.      Mental Status: She is alert and oriented to person, place, and time.         Assessment/Plan   Problem List Items Addressed This Visit    None  Visit Diagnoses         Codes    Scleral injection    -  Primary H57.89    Zoster ocular disease, unspecified     B02.30          While bilateral zoster ophthalmicus seems unlikely, there is significant risk for morbidity if this is the case.  This was discussed with the patient, recommending transfer to the ED for urgent ophthalmology evaluation.    Discussed with attending physician Dr. Mendenhall.     Jesus Pascual DO     "

## 2024-12-10 NOTE — ED PROVIDER NOTES
"HPI   Chief Complaint   Patient presents with    Eye Problem     Possible \"shingles\" in both eyes.  Sent from PCP       53-year-old female presents emergency room for bilateral eye redness, onset Saturday getting progressively worse, says she feels a severe pain behind both of her eyes.  Has a history of herpes zoster ophthalmicus, says that this feels exactly like it, she was sent in for IV acyclovir by her PCP.  She does take 800 mg twice a day of oral acyclovir for prophylaxis.  She said this feels like what happens when she gets her flareups.  He says she has no cutaneous involvement today.      History provided by:  Patient          Patient History   Past Medical History:   Diagnosis Date    Personal history of (healed) traumatic fracture 2022    History of fracture of vertebra    Personal history of malignant melanoma of skin 2022    History of malignant melanoma    Personal history of malignant neoplasm of breast 2022    History of malignant neoplasm of breast     Past Surgical History:   Procedure Laterality Date    OTHER SURGICAL HISTORY  2019    Hysterectomy    OTHER SURGICAL HISTORY  2019    Vaginal sling procedure    OTHER SURGICAL HISTORY  2022    Lumpectomy    OTHER SURGICAL HISTORY  2019    Rectocele repair     No family history on file.  Social History     Tobacco Use    Smoking status: Former     Current packs/day: 0.00     Types: Cigarettes     Quit date: 2024     Years since quittin.4    Smokeless tobacco: Never   Vaping Use    Vaping status: Never Used   Substance Use Topics    Alcohol use: Not on file    Drug use: Never       Physical Exam   ED Triage Vitals [12/10/24 1253]   Temperature Heart Rate Respirations BP   36.6 °C (97.9 °F) 74 16 113/71      Pulse Ox Temp Source Heart Rate Source Patient Position   100 % Temporal Monitor Sitting      BP Location FiO2 (%)     Right arm --       Physical Exam  Vitals and nursing note reviewed. "   Constitutional:       General: She is not in acute distress.  HENT:      Head: Normocephalic and atraumatic.   Eyes:      General: No scleral icterus.        Right eye: No discharge.         Left eye: No discharge.      Extraocular Movements: Extraocular movements intact.      Pupils: Pupils are equal, round, and reactive to light.      Comments: Bilateral conjunctival injection.  No pain with extraocular movements, no preseptal swelling.   Cardiovascular:      Rate and Rhythm: Normal rate and regular rhythm.      Pulses: Normal pulses.   Pulmonary:      Effort: Pulmonary effort is normal.   Abdominal:      General: Abdomen is flat.      Palpations: Abdomen is soft.      Tenderness: There is no abdominal tenderness. There is no guarding or rebound.   Musculoskeletal:         General: No deformity.      Right lower leg: No edema.      Left lower leg: No edema.   Skin:     General: Skin is warm and dry.   Neurological:      Mental Status: She is alert and oriented to person, place, and time. Mental status is at baseline.   Psychiatric:         Mood and Affect: Mood normal.         Behavior: Behavior normal.           ED Course & MDM   ED Course as of 12/10/24 1532   e Dec 10, 2024   1427 Woods lamp exam shows no corneal abrasions, negative Sascha sign.  Right eye pressure 16, left eye pressure 15 [RD]   1522 Ophthalmology accepted patient for transfer downto ED to evaluate [RD]      ED Course User Index  [RD] Culeln Thakkar DO         Diagnoses as of 12/10/24 1532   Acute conjunctivitis of both eyes, unspecified acute conjunctivitis type                 No data recorded     Mary Jo Coma Scale Score: 15 (12/10/24 1255 : Fallon Mayorga RN)                           Medical Decision Making  53 female comes emergency for bilateral conjunctivitis, has a history of herpes zoster ophthalmology gets.  Was lamp showed no corneal abrasion, negative Sascha sign, right eye pressure was 16, left eye pressure was 15.  Spoke  with ophthalmology, patient was [place] by [place].        Procedure  Procedures     Cullen Thakkar DO  Resident  12/10/24 0599

## 2024-12-10 NOTE — PROGRESS NOTES
Transfer Center/Expected Patient     Transferring Facility [facility]    Reason for Transfer [facility]oster Opthal[facility]    Accepting Service Op[facility]    Additional Work Up/Orders Needed: Optho Eval

## 2024-12-11 NOTE — CONSULTS
History of Present Illness:     Shelly Escobar is a 53 y.o. 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    Patient states that her symptoms began 4 days ago with initial sx of b/l eye redness. Notes that she has been sick for the past 1-2 months with bronchitis. Her PCP advised her to present to ED given her immunocompromised status in setting of Darier's disease, and out of concern for herpes zoster reactivation. Patient reports issues with eye redness, foreign body sensation, watering and some discharge. She has tried artificial tears without much improvement per pt.     She reports prior history of HZO but that this was many years ago and she has since been on acyclovir 800mg BID ppx. Denies any prior ocular surgeries, medications, diagnoses. She follows with a private optometrist for her routine care.    PMHx:   Past Medical History:   Diagnosis Date    Personal history of (healed) traumatic fracture 08/01/2022    History of fracture of vertebra    Personal history of malignant melanoma of skin 08/01/2022    History of malignant melanoma    Personal history of malignant neoplasm of breast 08/01/2022    History of malignant neoplasm of breast     Medications: As per Medication List  Allergies: Valacyclovir and Latex  Past Ocular History: as per above HPI    Physical Examination:     Slit Lamp and Fundus Exam       External Exam         Right Left    External Normal Normal              Slit Lamp Exam         Right Left    Lids/Lashes Normal Normal    Conjunctiva/Sclera 1+ Injection, mild discharge noted 1+ Injection, mild discharge noted    Cornea 1+ SPK inf Clear    Anterior Chamber Deep and quiet Deep and quiet    Iris Round and reactive Round and reactive    Lens Trace Nuclear sclerosis Trace Nuclear sclerosis    Anterior Vitreous Vitreous syneresis Vitreous syneresis              Fundus Exam         Right  Left    Disc Normal Normal    C/D Ratio 0.3 0.2    Macula Flat, intact Flat, intact    Vessels Normal course and caliber Normal course and caliber    Periphery No tears, breaks, or holes No tears, breaks, or holes                      Assessment and Plan:    #Acute conjunctivitis both eyes  - Pt presents with redness/itching/watering of both eyes x 4 days. Also with some purulent discharge reported by patient, trace amount noted on exam.  - There is no concern for HZO at this time - no vesicular rash, no corneal involvement, no anterior chamber inflammation, posterior segment exam wnl.   - Conjunctivitis is most likely viral in etiology however given discharge seen, will add on topical abx in addition to supportive therapy as below.    - Start artifiical tears QID both eyes  - Start Vigamox QID both eyes x 5 days  - Frequent hand washing advised, cleaning of home surfaces  - Can use cool compresses for symptomatic relief    Recommend follow-up with  [facility], within 2-3 weeks. Please call [phone] (EYES) to make appointment.    Faisal Noble MD  Ophthalmology PGY2      Ophthalmology Adult Pager - [phone]  Ophthalmology Pediatrics Pager - [phone]    For adult follow-up appointments, call: [phone]  For pediatric follow-up appointments, call: [phone]    Brief Key of Common Ophthalmology Abbreviations:  OD: right eye  OS: left eye  OU: both eyes  VA: visual acuity   IOP: intraocular pressure  EOMs: extraocular movements  CVF: confrontal visual fields  DFE: dilated fundus exam  DBH: dot blot hemorrhage  CWS: cotton wool spot  AC: anterior chamber  RD: retinal detachment  PVD: posterior vitreous detachment    NOTE: This note is not finalized until attending reviews and signs.

## 2024-12-11 NOTE — DISCHARGE INSTRUCTIONS
You were seen in the emergency department for evaluation of redness and eye pain of both eyes.    Your exam showed no concern for herpes zoster    You were prescribed Vigamox and artificial tears.  Ophthalmology will contact you to set up a follow-up appointment in the next couple of weeks.    Please follow-up with your primary care provider.  If you do not have a primary care provider you can call [phone] to make an appointment.    Please do not hesitate to return to the ED if symptoms change or worsen.

## 2024-12-12 NOTE — PROGRESS NOTES
I saw and evaluated the patient. I personally obtained the key and critical portions of the history and physical exam or was physically present for key and critical portions performed by the resident/fellow. I reviewed the resident/fellow's documentation and discussed the patient with the resident/fellow. I agree with the resident/fellow's medical decision making as documented in the note.    Vidal Mendenhall, DO

## 2024-12-16 ENCOUNTER — TELEPHONE (OUTPATIENT)
Dept: PRIMARY CARE | Facility: CLINIC | Age: 54
End: 2024-12-16
Payer: COMMERCIAL

## 2024-12-16 DIAGNOSIS — H57.89 REDNESS OF BOTH EYES: ICD-10-CM

## 2024-12-16 DIAGNOSIS — H57.13 PAIN OF BOTH EYES: ICD-10-CM

## 2024-12-16 RX ORDER — MOXIFLOXACIN 5 MG/ML
1 SOLUTION/ DROPS OPHTHALMIC 4 TIMES DAILY
Qty: 3 ML | Refills: 0 | OUTPATIENT
Start: 2024-12-16 | End: 2024-12-21

## 2024-12-16 NOTE — TELEPHONE ENCOUNTER
Rx Refill Request Telephone Encounter    Name:  Shelly Escobar  :  [id number]  Medication Name:            moxifloxacin (Vigamox) 0.5 % ophthalmic solution        Sig: Administer 1 drop into both eyes 4 times a day for 5 days. 1 drop(s) in each affected eye every 2 hours while awake for days 1-2, and 1 drop every 4 hours while awake for days 3-7          Specific Pharmacy location:    [id number] [address] [address]  Phone: [phone]  Fax: [phone]

## 2024-12-16 NOTE — TELEPHONE ENCOUNTER
MEDICATION REFILL   CALLED 2 X          moxifloxacin (Vigamox) 0.5 % ophthalmic solution             Sig: Administer 1 drop into both eyes 4 times a day for 5 days. 1 drop(s) in each affected eye every 2 hours while awake for days 1-2, and 1 drop every 4 hours while awake for days 3-7           [id number] - [place] [id number] [address] [address]  Phone: [phone]  Fax: [phone]  SUZY #: --

## 2024-12-19 ENCOUNTER — OFFICE VISIT (OUTPATIENT)
Dept: PRIMARY CARE | Facility: CLINIC | Age: 54
End: 2024-12-19
Payer: COMMERCIAL

## 2024-12-19 VITALS
WEIGHT: 161.38 LBS | OXYGEN SATURATION: 97 % | HEART RATE: 78 BPM | DIASTOLIC BLOOD PRESSURE: 83 MMHG | SYSTOLIC BLOOD PRESSURE: 120 MMHG | RESPIRATION RATE: 16 BRPM | TEMPERATURE: 97.5 F | BODY MASS INDEX: 26.05 KG/M2

## 2024-12-19 DIAGNOSIS — H10.33 ACUTE BACTERIAL CONJUNCTIVITIS OF BOTH EYES: Primary | ICD-10-CM

## 2024-12-19 DIAGNOSIS — Z20.2 EXPOSURE TO SEXUALLY TRANSMITTED DISEASE (STD): ICD-10-CM

## 2024-12-19 DIAGNOSIS — J35.1 CHRONIC TONSILLAR HYPERTROPHY: ICD-10-CM

## 2024-12-19 PROCEDURE — 87081 CULTURE SCREEN ONLY: CPT

## 2024-12-19 PROCEDURE — 99214 OFFICE O/P EST MOD 30 MIN: CPT

## 2024-12-19 PROCEDURE — 87591 N.GONORRHOEAE DNA AMP PROB: CPT

## 2024-12-19 PROCEDURE — 87491 CHLMYD TRACH DNA AMP PROBE: CPT

## 2024-12-19 NOTE — PROGRESS NOTES
Subjective   Shelly Escobar is a 54 y.o. female who presents for Eye Problem (Pt herter today to F/U for eye issues.).  Seen by Dr. Pascual on 12/10/2024 for bilateral conjunctival injection.  She did report at that time that she has a history of herpes zoster ophthalmicus which is occurred in the past and she has been hospitalized for.  Given the concern, she was referred to [facility].  At [facility] she was evaluated with slit lamp, visual acuity, and ocular pressure testing all of which was normal.  [facility] ophthalmology was consulted and she was referred to be evaluated at [facility].  She was evaluated at [facility] and was cleared for home discharge with vigamox drops.     Today: has been taking vigamox drops for 5 days with some improvement now (has them at home). Still having eye discharge and gritty sensation     Has new globus sensation of the R side of throat now but not particularly painful. No difficulty eating/drinking.     Reports significant Hx of being sexually active. Her and her  are notably active with numerous partners. Would like to be screened for several STIs.     Has scheduled fu with optho on 1/2       Objective     /83 (BP Location: Right arm, Patient Position: Sitting)   Pulse 78   Temp 36.4 °C (97.5 °F) (Temporal)   Resp 16   Wt 73.2 kg (161 lb 6 oz)   SpO2 97%   BMI 26.05 kg/m²   Physical Exam  Constitutional:       General: She is not in acute distress.     Appearance: Normal appearance. She is not ill-appearing.   HENT:      Head: Normocephalic and atraumatic.      Mouth/Throat:      Mouth: Mucous membranes are moist.      Pharynx: Oropharynx is clear. No oropharyngeal exudate or posterior oropharyngeal erythema.      Comments: B/l tonsillar hypertrophy, similar to previously documented. Uvula midline. Otherwise normal oropharynx.   Eyes:      General:         Right eye: No discharge.         Left eye: No discharge.      Extraocular Movements: Extraocular movements intact.       Conjunctiva/sclera: Conjunctivae normal.      Pupils: Pupils are equal, round, and reactive to light.      Comments: No notable conjunctival injection at this time    Musculoskeletal:         General: Normal range of motion.   Skin:     General: Skin is warm and dry.   Neurological:      General: No focal deficit present.      Mental Status: She is alert.   Psychiatric:         Mood and Affect: Mood normal.         Thought Content: Thought content normal.       Assessment & Plan  Acute bacterial conjunctivitis of both eyes  -See history and examination as above.  I believe that her acute conjunctivitis was likely bacterial based on response to Vigamox.  She is markedly improved.  I do not believe she requires any further treatment.  -Advised to follow-up with ophthalmology as scheduled on 1/2/2025       Exposure to sexually transmitted disease (STD)  -Patient and her  are sexually active with multiple partners; high risk for STIs  -Patient would like elective STI testing.  Orders as below.  Will obtain gonorrhea/committee testing of both urine sample and throat swab given her throat globus sensation  Orders:  •  C. trachomatis / N. gonorrhoeae, Amplified; Future  •  Syphilis Screen with Reflex; Future  •  Hepatitis C antibody; Future  •  HIV 1/2 Antigen/Antibody Screen with Reflex to Confirmation; Future  •  C. trachomatis / N. gonorrhoeae, Amplified; Future  •  Group A Streptococcus, Culture; Future    Chronic tonsillar hypertrophy  -She has a known tonsillar hypertrophy and multiple bouts of group A strep  -Will obtain culture at this time given that she is asymptomatic to determine if she is a carrier  -May consider an ENT referral in the future  Orders:  •  Group A Streptococcus, Culture; Future             Xavi Krishna D.O.   Family Medicine PGY-2, [facility]  12/19/24

## 2024-12-20 ENCOUNTER — LAB (OUTPATIENT)
Dept: LAB | Facility: LAB | Age: 54
End: 2024-12-20
Payer: COMMERCIAL

## 2024-12-20 DIAGNOSIS — Z20.2 EXPOSURE TO SEXUALLY TRANSMITTED DISEASE (STD): ICD-10-CM

## 2024-12-20 LAB
C TRACH RRNA SPEC QL NAA+PROBE: NEGATIVE
C TRACH RRNA SPEC QL NAA+PROBE: NEGATIVE
HCV AB SER QL: NONREACTIVE
HIV 1+2 AB+HIV1 P24 AG SERPL QL IA: NONREACTIVE
N GONORRHOEA DNA SPEC QL PROBE+SIG AMP: NEGATIVE
N GONORRHOEA DNA SPEC QL PROBE+SIG AMP: NEGATIVE
TREPONEMA PALLIDUM IGG+IGM AB [PRESENCE] IN SERUM OR PLASMA BY IMMUNOASSAY: NONREACTIVE

## 2024-12-20 PROCEDURE — 86780 TREPONEMA PALLIDUM: CPT

## 2024-12-20 PROCEDURE — 86803 HEPATITIS C AB TEST: CPT

## 2024-12-20 PROCEDURE — 36415 COLL VENOUS BLD VENIPUNCTURE: CPT

## 2024-12-20 PROCEDURE — 87389 HIV-1 AG W/HIV-1&-2 AB AG IA: CPT

## 2024-12-20 NOTE — PROGRESS NOTES
I reviewed the resident/fellow's documentation and discussed the patient with the resident/fellow. I agree with the resident/fellow's medical decision making as documented in the note.     Denice Jarrett MD

## 2024-12-22 LAB — S PYO THROAT QL CULT: NORMAL

## 2024-12-26 DIAGNOSIS — J35.1 CHRONIC TONSILLAR HYPERTROPHY: Primary | ICD-10-CM

## 2025-01-02 ENCOUNTER — APPOINTMENT (OUTPATIENT)
Dept: OPHTHALMOLOGY | Facility: CLINIC | Age: 55
End: 2025-01-02
Payer: COMMERCIAL

## 2025-01-03 ENCOUNTER — OFFICE VISIT (OUTPATIENT)
Dept: OTOLARYNGOLOGY | Facility: CLINIC | Age: 55
End: 2025-01-03
Payer: COMMERCIAL

## 2025-01-03 VITALS
TEMPERATURE: 97.4 F | BODY MASS INDEX: 26.05 KG/M2 | DIASTOLIC BLOOD PRESSURE: 82 MMHG | SYSTOLIC BLOOD PRESSURE: 118 MMHG | HEIGHT: 66 IN

## 2025-01-03 DIAGNOSIS — J35.1 CHRONIC TONSILLAR HYPERTROPHY: ICD-10-CM

## 2025-01-03 PROCEDURE — 99203 OFFICE O/P NEW LOW 30 MIN: CPT | Performed by: OTOLARYNGOLOGY

## 2025-01-03 PROCEDURE — 1036F TOBACCO NON-USER: CPT | Performed by: OTOLARYNGOLOGY

## 2025-01-03 NOTE — PROGRESS NOTES
Impression:  Tonsillar hypertrophy-stable  Prominent submandibular glands-stable    RECOMMENDATIONS/PLAN :  I reassured the patient that her tonsils are mildly enlarged however I do not recommend any surgical intervention.  I also mention that her submandibular glands are mildly ptotic however they feel normal and there is no palpable masses.  She can otherwise follow-up with her family physician as needed.      **This electronic medical record note was created with the use of voice recognition software.  Despite proofreading, typographical or grammatical errors may be present that could affect meaning of content **    Subjective   Patient ID:     Shelly Escobar is a 54 y.o. female who presents to the office today stating that she has been told that her tonsils are enlarged.  She was also concerned about her palpable submandibular glands however she denies any recurrent infections or any history of salivary stones.  She denies any fever chills or difficulty swallowing.  No history of recurrent tonsillitis or sleep apnea.    ROS:  A detailed 12 system review of systems is noted on the intake form has been reviewed with the patient with details noted in the HPI and scanned into the patient's medical record.    Objective     Past Medical History:   Diagnosis Date    Personal history of (healed) traumatic fracture 08/01/2022    History of fracture of vertebra    Personal history of malignant melanoma of skin 08/01/2022    History of malignant melanoma    Personal history of malignant neoplasm of breast 08/01/2022    History of malignant neoplasm of breast        Past Surgical History:   Procedure Laterality Date    OTHER SURGICAL HISTORY  03/14/2019    Hysterectomy    OTHER SURGICAL HISTORY  03/14/2019    Vaginal sling procedure    OTHER SURGICAL HISTORY  08/01/2022    Lumpectomy    OTHER SURGICAL HISTORY  04/29/2019    Rectocele repair        Allergies   Allergen Reactions    Valacyclovir Itching     migraine    Latex  "Rash          Current Outpatient Medications:     acitretin (Soriatane) 25 mg capsule, Take 1 pill by mouth daily with food., Disp: 30 capsule, Rfl: 11    acyclovir (Zovirax) 800 mg tablet, Take 1 tablet (800 mg) by mouth 2 times a day. For prophylaxis, Disp: 60 tablet, Rfl: 11    aluminum sulfate-calcium acetate ([place]) 952-1,347 mg packet, Apply topically., Disp: , Rfl:     clobetasol (Temovate) 0.05 % external solution, 3-4 drops to affected ear BID x 7 days, then use as needed for itching for 3 days, Disp: 50 mL, Rfl: 11    clobetasol (Temovate) 0.05 % ointment, 1 Application twice daily x 2 weeks then as needed for flares, Disp: 60 g, Rfl: 11     Tobacco Use: Medium Risk (1/3/2025)    Patient History     Smoking Tobacco Use: Former     Smokeless Tobacco Use: Never     Passive Exposure: Not on file        Alcohol Use: Not on file        Social History     Substance and Sexual Activity   Drug Use Never        Physical Exam:  Visit Vitals  /82   Temp 36.3 °C (97.4 °F) (Temporal)   Ht 1.676 m (5' 6\")   BMI 26.05 kg/m²   OB Status Postmenopausal   Smoking Status Former   BSA 1.85 m²      General: Patient is alert, oriented, cooperative in no apparent distress.  Head: Normocephalic, atraumatic.  Eyes: PERRL, EOMI, Conjunctiva is clear. No nystagmus.  Ears: Right Ear-- Pinna is normal.  She does have severe eczema.  External auditory canal is patent. Tympanic membrane is [intact, translucent and has good mobility with my pneumatic otoscope. No effusion].  Mastoid is nontender.  Left ear-- Pinna is normal.  She does have severe eczema.  External auditory canal is patent. Tympanic membrane is [intact, translucent and has good mobility with my pneumatic otoscope.  No effusion].  Mastoid is nontender.  Nose: Septum is straight.  No septal perforation or lesions. No septal hematoma/ seroma.  No signs of bleeding.  Inferior turbinates are normal.   No evidence of intranasal polyps.  No infectious drainage.  Throat: "  Floor of mouth is clear, no masses.  Tongue appears normal, no lesions or masses. Gums, gingiva, buccal mucosa appear pink and moist, no lesions. Teeth are in fair repair.  No obvious dental infections.  Peritonsillar regions appear symmetric without swelling.  Hard and soft palate appear normal, no obvious cleft. Uvula is midline.  Left Tonsil --+2.5, no exudates.  Right Tonsil --+2.5, no exudates.  Oropharynx: No lesions. Retropharyngeal wall is flat.  No active postnasal drip.  Neck: Supple,  no lymphadenopathy.  No masses.  She does have ptotic submandibular glands.  No palpable masses.  No fixation to the overlying skin.  Salivary Glands: Symmetric bilaterally.  No palpable masses.  No evidence of acute infection or salivary stones  Neurologic: Cranial Nerves 2-12 are grossly intact without focal deficits. Cerebellar function testing is normal.     Results:   []    Procedure:   []    Luciano Resendiz, DO

## 2025-02-11 NOTE — PROGRESS NOTES
Subjective     Shelly Escobar is a 54 y.o. female who presents for the following: Skin Check (Annual FBSE. Hx of MM - 8/2022. Pt reports area of concern located on Nose. 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 Pt currently using Clobetasol Solution and Ointment, Acitretin 25 mg every day, and Acyclovir. Pt reports no changes. /).     Skin Cancer Screening  She has a history of heavy sun exposure. She is in the sun daily. She uses sunscreen daily. She reports itching, pain, and burning. Her moles are bleeding and painful.    Spots that concern her: Nose.     Hx of MM - 8/2022 on R dorsal forearm    Review of Systems:  No other skin or systemic complaints other than what is documented elsewhere in the note.    The following portions of the chart were reviewed this encounter and updated as appropriate:       Skin Cancer History  No skin cancer on file.    Specialty Problems          Dermatology Problems    Darier disease     Past Medical History:  Shelly Escobar  has a past medical history of Personal history of (healed) traumatic fracture (08/01/2022), Personal history of malignant melanoma of skin (08/01/2022), and Personal history of malignant neoplasm of breast (08/01/2022).    Past Surgical History:  Shelly Escobar  has a past surgical history that includes Other surgical history (03/14/2019); Other surgical history (03/14/2019); Other surgical history (08/01/2022); and Other surgical history (04/29/2019).    Family History:  Patient family history is not on file.       Objective   Well appearing patient in no apparent distress; mood and affect are within normal limits.    A full examination was performed including scalp, head, eyes, ears, nose, lips, neck, chest, axillae, abdomen, back, buttocks, bilateral upper extremities, bilateral lower extremities, hands, feet, fingers, toes, fingernails, and toenails. All findings within normal limits unless otherwise noted  below.    Assessment/Plan   1. Rosacea  Head - Anterior (Face)  most prominent over the bilateral medial cheeks and nose, there is moderate underlying erythema with several overlying telangiectasias and a few scattered erythematous, inflammatory papules with excoriation    - the chronic and intermittently flaring nature of this condition and treatment options were discussed with the patient today.    - advised pt not to use clobetasol on face  - start azelaic acid 15% gel up to twice daily to face    Related Procedures  Follow Up In Dermatology - Established Patient    Related Medications  azelaic acid (Finacea) 15 % gel  Use on face up to twice daily    2. Manoj-mnaoj disease  Patient is clear on exam.       - pt is well controlled on acitretin 25 mg daily, refilled today  - continue clobetasol ointment(for non-face, non-groin flares) and solution (for conchal bowls) PRN  - continue prophylactic acyclovir  - advised pt to get retinoid panel done in near future      Retinoid Panel(AST,ALT,TRIG,CBC)    Related Procedures  Follow Up In Dermatology - Established Patient    Related Medications  acitretin (Soriatane) 25 mg capsule  Take 1 pill by mouth daily with food.    acyclovir (Zovirax) 800 mg tablet  Take 1 tablet (800 mg) by mouth 2 times a day. For prophylaxis    clobetasol (Temovate) 0.05 % ointment  1 Application twice daily x 2 weeks then as needed for flares    clobetasol (Temovate) 0.05 % external solution  3-4 drops to affected ear BID x 7 days, then use as needed for itching for 3 days    3. Hemangioma of skin  Scattered cherry-red papule(s).    This is a benign finding and requires no treatment.      4. Lentigo  Scattered tan macules in sun-exposed areas.    The ABCDEs of melanoma and warning signs of non-melanoma skin cancer were discussed with patient and patient expressed understanding. Sun protection and use of at least SPF 30 discussed with patient. Pt instructed to reapply every 2 hours.     5.  Melanocytic nevus, unspecified location  All nevi were symmetric brown macules without atypia on dermoscopy.     The ABCDEs of melanoma and warning signs of non-melanoma skin cancer were discussed with patient and patient expressed understanding. Sun protection and use of at least SPF 30 discussed with patient. Pt instructed to reapply every 2 hours.     6. Seborrheic keratosis  Stuck on verrucous, tan-brown papules and plaques.      This is a benign finding and requires no treatment.      7. Scar conditions and fibrosis of skin  Scar is well-healed without evidence of recurrence on R forearm from hx melanoma 8/2022    Related Procedures  Follow Up In Dermatology - Established Patient    8. Personal history of malignant melanoma of skin    Related Procedures  Follow Up In Dermatology - Established Patient    9. Encounter for screening for malignant neoplasm of skin    10. Darier disease  Thin plaques of rough, sandpaper textured hyperkeratotic papules mostly in the bilateral conchal bowls, some on the upper chest/shoulders/upper back    Well-controlled  - continue clobetasol ointment on non-face areas      Related Procedures  Follow Up In Dermatology - Established Patient    11. Other long term (current) drug therapy    Related Procedures  Retinoid Panel(AST,ALT,TRIG,CBC)    F/u in 1yr  Pt seen by Dr. Luana Wright, PGY-2    I saw and evaluated the patient. I personally obtained the key and critical portions of the history and physical exam or was physically present for key and critical portions performed by the student/resident. I reviewed the student/resident's documentation and discussed the patient with the student/resident. I was present for the entirety of any procedure(s). I agree with the student/resident's medical decision making as documented in the note.

## 2025-02-12 ENCOUNTER — APPOINTMENT (OUTPATIENT)
Dept: DERMATOLOGY | Facility: CLINIC | Age: 55
End: 2025-02-12
Payer: COMMERCIAL

## 2025-02-12 DIAGNOSIS — L81.4 LENTIGO: ICD-10-CM

## 2025-02-12 DIAGNOSIS — L90.5 SCAR CONDITIONS AND FIBROSIS OF SKIN: ICD-10-CM

## 2025-02-12 DIAGNOSIS — Z12.83 ENCOUNTER FOR SCREENING FOR MALIGNANT NEOPLASM OF SKIN: ICD-10-CM

## 2025-02-12 DIAGNOSIS — L82.1 SEBORRHEIC KERATOSIS: ICD-10-CM

## 2025-02-12 DIAGNOSIS — Z85.820 PERSONAL HISTORY OF MALIGNANT MELANOMA OF SKIN: ICD-10-CM

## 2025-02-12 DIAGNOSIS — Z79.899 OTHER LONG TERM (CURRENT) DRUG THERAPY: ICD-10-CM

## 2025-02-12 DIAGNOSIS — D18.01 HEMANGIOMA OF SKIN: ICD-10-CM

## 2025-02-12 DIAGNOSIS — L71.9 ROSACEA: Primary | ICD-10-CM

## 2025-02-12 DIAGNOSIS — D22.9 MELANOCYTIC NEVUS, UNSPECIFIED LOCATION: ICD-10-CM

## 2025-02-12 DIAGNOSIS — Q82.8 HAILEY-HAILEY DISEASE: ICD-10-CM

## 2025-02-12 DIAGNOSIS — Q82.8 DARIER DISEASE: ICD-10-CM

## 2025-02-12 PROCEDURE — G2211 COMPLEX E/M VISIT ADD ON: HCPCS | Performed by: DERMATOLOGY

## 2025-02-12 PROCEDURE — 99214 OFFICE O/P EST MOD 30 MIN: CPT | Performed by: DERMATOLOGY

## 2025-02-12 PROCEDURE — 1036F TOBACCO NON-USER: CPT | Performed by: DERMATOLOGY

## 2025-02-12 RX ORDER — ACYCLOVIR 800 MG/1
800 TABLET ORAL 2 TIMES DAILY
Qty: 60 TABLET | Refills: 11 | Status: SHIPPED | OUTPATIENT
Start: 2025-02-12

## 2025-02-12 RX ORDER — CLOBETASOL PROPIONATE 0.5 MG/ML
SOLUTION TOPICAL
Qty: 50 ML | Refills: 11 | Status: SHIPPED | OUTPATIENT
Start: 2025-02-12

## 2025-02-12 RX ORDER — CLOBETASOL PROPIONATE 0.5 MG/G
OINTMENT TOPICAL
Qty: 60 G | Refills: 11 | Status: SHIPPED | OUTPATIENT
Start: 2025-02-12

## 2025-02-12 RX ORDER — ACITRETIN 25 MG/1
CAPSULE ORAL
Qty: 30 CAPSULE | Refills: 11 | Status: SHIPPED | OUTPATIENT
Start: 2025-02-12

## 2025-02-12 RX ORDER — AZELAIC ACID 0.15 G/G
GEL TOPICAL
Qty: 60 G | Refills: 11 | Status: SHIPPED | OUTPATIENT
Start: 2025-02-12

## 2025-03-07 DIAGNOSIS — Q82.8 HAILEY-HAILEY DISEASE: ICD-10-CM

## 2025-03-07 DIAGNOSIS — Z79.899 OTHER LONG TERM (CURRENT) DRUG THERAPY: ICD-10-CM

## 2025-04-04 DIAGNOSIS — Q82.8 HAILEY-HAILEY DISEASE: ICD-10-CM

## 2025-04-04 DIAGNOSIS — Z79.899 OTHER LONG TERM (CURRENT) DRUG THERAPY: ICD-10-CM

## 2025-05-02 DIAGNOSIS — Z79.899 OTHER LONG TERM (CURRENT) DRUG THERAPY: ICD-10-CM

## 2025-05-02 DIAGNOSIS — Q82.8 HAILEY-HAILEY DISEASE: ICD-10-CM

## 2025-05-30 DIAGNOSIS — Z79.899 OTHER LONG TERM (CURRENT) DRUG THERAPY: ICD-10-CM

## 2025-05-30 DIAGNOSIS — Q82.8 HAILEY-HAILEY DISEASE: ICD-10-CM

## 2025-06-27 DIAGNOSIS — Z79.899 OTHER LONG TERM (CURRENT) DRUG THERAPY: ICD-10-CM

## 2025-06-27 DIAGNOSIS — Q82.8 HAILEY-HAILEY DISEASE: ICD-10-CM

## 2025-07-17 ENCOUNTER — APPOINTMENT (OUTPATIENT)
Dept: PRIMARY CARE | Facility: CLINIC | Age: 55
End: 2025-07-17
Payer: COMMERCIAL

## 2025-07-17 VITALS
RESPIRATION RATE: 16 BRPM | DIASTOLIC BLOOD PRESSURE: 76 MMHG | TEMPERATURE: 97.2 F | SYSTOLIC BLOOD PRESSURE: 112 MMHG | OXYGEN SATURATION: 97 % | HEIGHT: 66 IN | WEIGHT: 166.25 LBS | BODY MASS INDEX: 26.72 KG/M2 | HEART RATE: 97 BPM

## 2025-07-17 DIAGNOSIS — Z23 NEED FOR VACCINATION: ICD-10-CM

## 2025-07-17 DIAGNOSIS — Z78.0 ASYMPTOMATIC MENOPAUSAL STATE: ICD-10-CM

## 2025-07-17 DIAGNOSIS — Z00.00 ROUTINE GENERAL MEDICAL EXAMINATION AT HEALTH CARE FACILITY: Primary | ICD-10-CM

## 2025-07-17 DIAGNOSIS — D32.9 MENINGIOMA (MULTI): ICD-10-CM

## 2025-07-17 DIAGNOSIS — M15.0 PRIMARY OSTEOARTHRITIS INVOLVING MULTIPLE JOINTS: ICD-10-CM

## 2025-07-17 DIAGNOSIS — Z12.31 ENCOUNTER FOR SCREENING MAMMOGRAM FOR BREAST CANCER: ICD-10-CM

## 2025-07-17 DIAGNOSIS — M85.89 OSTEOPENIA OF MULTIPLE SITES: ICD-10-CM

## 2025-07-17 DIAGNOSIS — M25.50 DIFFUSE ARTHRALGIA: ICD-10-CM

## 2025-07-17 DIAGNOSIS — Z13.220 SCREENING FOR HYPERLIPIDEMIA: ICD-10-CM

## 2025-07-17 PROBLEM — N81.6 HERNIATION OF RECTUM INTO VAGINA: Status: RESOLVED | Noted: 2024-11-20 | Resolved: 2025-07-17

## 2025-07-17 PROBLEM — J45.909 AIRWAY HYPERREACTIVITY (HHS-HCC): Status: RESOLVED | Noted: 2024-11-20 | Resolved: 2025-07-17

## 2025-07-17 SDOH — ECONOMIC STABILITY: FOOD INSECURITY: WITHIN THE PAST 12 MONTHS, THE FOOD YOU BOUGHT JUST DIDN'T LAST AND YOU DIDN'T HAVE MONEY TO GET MORE.: NEVER TRUE

## 2025-07-17 SDOH — ECONOMIC STABILITY: INCOME INSECURITY: IN THE LAST 12 MONTHS, WAS THERE A TIME WHEN YOU WERE NOT ABLE TO PAY THE MORTGAGE OR RENT ON TIME?: NO

## 2025-07-17 SDOH — ECONOMIC STABILITY: TRANSPORTATION INSECURITY
IN THE PAST 12 MONTHS, HAS THE LACK OF TRANSPORTATION KEPT YOU FROM MEDICAL APPOINTMENTS OR FROM GETTING MEDICATIONS?: NO

## 2025-07-17 SDOH — ECONOMIC STABILITY: TRANSPORTATION INSECURITY
IN THE PAST 12 MONTHS, HAS LACK OF TRANSPORTATION KEPT YOU FROM MEETINGS, WORK, OR FROM GETTING THINGS NEEDED FOR DAILY LIVING?: NO

## 2025-07-17 SDOH — HEALTH STABILITY: PHYSICAL HEALTH: ON AVERAGE, HOW MANY DAYS PER WEEK DO YOU ENGAGE IN MODERATE TO STRENUOUS EXERCISE (LIKE A BRISK WALK)?: 7 DAYS

## 2025-07-17 SDOH — HEALTH STABILITY: PHYSICAL HEALTH: ON AVERAGE, HOW MANY MINUTES DO YOU ENGAGE IN EXERCISE AT THIS LEVEL?: 50 MIN

## 2025-07-17 SDOH — ECONOMIC STABILITY: FOOD INSECURITY: WITHIN THE PAST 12 MONTHS, YOU WORRIED THAT YOUR FOOD WOULD RUN OUT BEFORE YOU GOT MONEY TO BUY MORE.: NEVER TRUE

## 2025-07-17 ASSESSMENT — ENCOUNTER SYMPTOMS
OCCASIONAL FEELINGS OF UNSTEADINESS: 0
LOSS OF SENSATION IN FEET: 0
DEPRESSION: 0

## 2025-07-17 ASSESSMENT — PATIENT HEALTH QUESTIONNAIRE - PHQ9
1. LITTLE INTEREST OR PLEASURE IN DOING THINGS: NOT AT ALL
2. FEELING DOWN, DEPRESSED OR HOPELESS: NOT AT ALL
SUM OF ALL RESPONSES TO PHQ9 QUESTIONS 1 & 2: 0

## 2025-07-17 ASSESSMENT — SOCIAL DETERMINANTS OF HEALTH (SDOH)
IN THE PAST 12 MONTHS, HAS THE ELECTRIC, GAS, OIL, OR WATER COMPANY THREATENED TO SHUT OFF SERVICE IN YOUR HOME?: NO
HOW OFTEN DO YOU ATTEND CHURCH OR RELIGIOUS SERVICES?: NEVER
HOW OFTEN DO YOU ATTENT MEETINGS OF THE CLUB OR ORGANIZATION YOU BELONG TO?: NEVER
HOW HARD IS IT FOR YOU TO PAY FOR THE VERY BASICS LIKE FOOD, HOUSING, MEDICAL CARE, AND HEATING?: NOT VERY HARD
IN A TYPICAL WEEK, HOW MANY TIMES DO YOU TALK ON THE PHONE WITH FAMILY, FRIENDS, OR NEIGHBORS?: MORE THAN THREE TIMES A WEEK
DO YOU BELONG TO ANY CLUBS OR ORGANIZATIONS SUCH AS CHURCH GROUPS UNIONS, FRATERNAL OR ATHLETIC GROUPS, OR SCHOOL GROUPS?: NO
WITHIN THE LAST YEAR, HAVE YOU BEEN AFRAID OF YOUR PARTNER OR EX-PARTNER?: NO
HOW OFTEN DO YOU GET TOGETHER WITH FRIENDS OR RELATIVES?: THREE TIMES A WEEK

## 2025-07-17 ASSESSMENT — LIFESTYLE VARIABLES
HOW OFTEN DO YOU HAVE SIX OR MORE DRINKS ON ONE OCCASION: NEVER
HOW MANY STANDARD DRINKS CONTAINING ALCOHOL DO YOU HAVE ON A TYPICAL DAY: 1 OR 2
HOW OFTEN DO YOU HAVE A DRINK CONTAINING ALCOHOL: 2-4 TIMES A MONTH
AUDIT-C TOTAL SCORE: 2
SKIP TO QUESTIONS 9-10: 1

## 2025-07-17 NOTE — ASSESSMENT & PLAN NOTE
Incidentally discovered on MRI thoracic spine in 2022 was a thoracic meningioma, saw Dr. Escobar NSGY 2022, no surgical intervention at that time.  Denies any new numbness, weakness, tingling of any kind.  No new neurologic symptoms at this time.

## 2025-07-17 NOTE — ASSESSMENT & PLAN NOTE
- As above  -Known history of diffuse degenerative changes on multiple imaging including her MRI of her spine from 2022  Orders:    Referral to Physical Therapy; Future    XR knee right 4+ views; Future

## 2025-07-17 NOTE — PROGRESS NOTES
"Subjective   Shelly Escobar is a 54 y.o. female who presents for Annual Exam (Pt presents for annual physical exam. ).  Here today for annual physical.    Also was not complaining of 3 weeks diffuse joint pain and R sided neck pain, R knee pain.     Had a compression fracture in 2022, had extensive workup at that time including MRI of her entire spine.     Incidentally discovered was a thoracic meningioma, saw Dr. Escobar NSGY 2022, no surgical intervention at that time.  Denies any new numbness, weakness, tingling of any kind.  No new neurologic symptoms at this time.    Has a DEXA scan from 2022 which revealed osteopenia, had hysterectomy 2007, was on Leupron shots prior to that, likely source of early onset osteopenia.  No fracture since then.    Never took oral steroids for any extended period of time.     Has chronic shingles, takes acyclovir.  Is agreeable to initial shingles vaccination today.    R knee pain, no effusions.  No known mechanism of injury.  No significant erythema or swelling.            Comp Hx reviewed and documented appropriately in chart.     SocHx: works on her feet all day at [employer]   Sexual activity: multiple partners, noted prior       Active Problem List  Problem List[1]    Comprehensive Medical/Surgical/Family History  Medical History[2]  Surgical History[3]    Social History  Social History     Social History Narrative    Not on file       Allergies and Medications  Valacyclovir and Latex  Medications Ordered Prior to Encounter[4]      /76 (BP Location: Right arm, Patient Position: Sitting, BP Cuff Size: Adult)   Pulse 97   Temp 36.2 °C (97.2 °F) (Temporal)   Resp 16   Ht 1.676 m (5' 6\")   Wt 75.4 kg (166 lb 4 oz)   SpO2 97%   BMI 26.83 kg/m²   Objective   Physical Exam  Constitutional:       General: She is not in acute distress.     Appearance: Normal appearance. She is not ill-appearing, toxic-appearing or diaphoretic.   HENT:      Head: Normocephalic and atraumatic. "      Right Ear: Tympanic membrane and ear canal normal. There is no impacted cerumen.      Left Ear: Tympanic membrane and ear canal normal. There is no impacted cerumen.      Nose: Nose normal.      Mouth/Throat:      Mouth: Mucous membranes are moist.      Pharynx: Oropharynx is clear. No oropharyngeal exudate or posterior oropharyngeal erythema.      Comments: Bilateral 2+ enlarged tonsils but without overlying exudate, erythema    Eyes:      Extraocular Movements: Extraocular movements intact.      Conjunctiva/sclera: Conjunctivae normal.      Pupils: Pupils are equal, round, and reactive to light.     Neck:      Thyroid: No thyroid mass, thyromegaly or thyroid tenderness.     Cardiovascular:      Rate and Rhythm: Normal rate and regular rhythm.      Pulses:           Radial pulses are 2+ on the right side and 2+ on the left side.        Posterior tibial pulses are 2+ on the right side and 2+ on the left side.      Heart sounds: Normal heart sounds.   Pulmonary:      Effort: Pulmonary effort is normal. No respiratory distress.      Breath sounds: Normal breath sounds. No wheezing, rhonchi or rales.     Musculoskeletal:         General: Normal range of motion.      Cervical back: Normal range of motion and neck supple. No tenderness.      Right lower leg: No edema.      Left lower leg: No edema.      Comments: Strength is 5/5 in all planes of motion of the bilateral upper extremities and bilateral lower extremities.  However, there is notable bilateral weakness to the lower extremities on examination with resistance.  There is no significant midline thoracic, cervical, lumbar tenderness to palpation.  There is no significant visible deformity or swelling of the spine.     Right knee has no overlying effusion, swelling, erythema.  There is mild tenderness to palpation along the medial aspect of the right knee along the medial condyle.  Anterior/posterior drawer test and varus/valgus stress tests are negative.    Lymphadenopathy:      Cervical: No cervical adenopathy.     Skin:     General: Skin is warm and dry.     Neurological:      General: No focal deficit present.      Mental Status: She is alert. Mental status is at baseline.      Cranial Nerves: Cranial nerves 2-12 are intact. No cranial nerve deficit, dysarthria or facial asymmetry.      Sensory: Sensation is intact. No sensory deficit.      Motor: Motor function is intact. No weakness, tremor or atrophy.      Gait: Gait normal.     Psychiatric:         Mood and Affect: Mood normal.         Thought Content: Thought content normal.         Assessment & Plan  Routine general medical examination at health care facility  - Comprehensive history reviewed and documented in chart, comprehensive physical examination as above  -Screening labs as ordered  Orders:    1 Year Follow Up In [facility] - PCP - Wellness Exam; Future    Comprehensive Metabolic Panel; Future    CBC; Future    TSH with reflex to Free T4 if abnormal; Future    Need for vaccination    Orders:    Zoster vaccine, recombinant, adult (SHINGRIX)    Asymptomatic menopausal state  - History as above, had medication induced early onset menopause, which is likely a predisposing factor to her osteopenia of multiple sites that was discovered in 2022  Orders:    XR DEXA bone density; Future    Encounter for screening mammogram for breast cancer  - Due for screening mammogram, ordered  Orders:    BI mammo bilateral screening tomosynthesis; Future    Screening for hyperlipidemia    Orders:    Lipid Panel; Future    Osteopenia of multiple sites  - See above, DEXA in 2022 with T-score as low as -2.0  -Will reassess at this time with repeat DEXA scan  -TSH ordered as well, total vitamin D levels ordered  -Patient endorses taking daily vitamin D since 2022  Orders:    XR DEXA bone density; Future    TSH with reflex to Free T4 if abnormal; Future    Vitamin D 25-Hydroxy,Total (for eval of Vitamin D levels);  Future    Diffuse arthralgia  - History as above, her symptoms more closely aligned with diffuse osteoarthritis and/or muscle sprain/strain due to her work  -She does not engage in any form of regular weightbearing exercise, and based on my examination, she does seem to have some equal muscular weakness.  She would very likely benefit from physical therapy evaluation to which she is agreeable, ordered  -Will check CRP/ESR to attempt to rule out underlying inflammatory process  -See history examination regarding her right knee pain as above, will order knee x-ray at this time to evaluate for underlying OA.  I advised her to continue with physical therapy and we can consider corticosteroid injection in the future if needed  Orders:    C-Reactive Protein; Future    Sedimentation Rate; Future    TSH with reflex to Free T4 if abnormal; Future    Referral to Physical Therapy; Future    XR knee right 4+ views; Future    Meningioma (Multi)  Incidentally discovered on MRI thoracic spine in 2022 was a thoracic meningioma, saw Dr. Escobar NSGY 2022, no surgical intervention at that time.  Denies any new numbness, weakness, tingling of any kind.  No new neurologic symptoms at this time.       Primary osteoarthritis involving multiple joints  - As above  -Known history of diffuse degenerative changes on multiple imaging including her MRI of her spine from 2022  Orders:    Referral to Physical Therapy; Future    XR knee right 4+ views; Future      Follow up based on results.     Xavi Krishna D.O.   Family Medicine PGY-3, [facility]  07/17/25      Please excuse any errors in grammar or translation related to this dictation. Voice recognition software may have been utilized to prepare this document.          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    HL (hearing loss) 1980    Personal history of (healed) traumatic fracture 08/01/2022    History of fracture of vertebra    Personal history of malignant melanoma of skin 08/01/2022    History of malignant melanoma    Personal history of malignant neoplasm of breast 08/01/2022    History of malignant neoplasm of breast    Urinary tract infection     Visual impairment    [3]   Past Surgical History:  Procedure Laterality Date    BREAST SURGERY  2007    HYSTERECTOMY  2006    OTHER SURGICAL HISTORY  03/14/2019    Hysterectomy    OTHER SURGICAL HISTORY  03/14/2019    Vaginal sling procedure    OTHER SURGICAL HISTORY  08/01/2022    Lumpectomy    OTHER SURGICAL HISTORY  04/29/2019    Rectocele repair    WISDOM TOOTH EXTRACTION  1986   [4]   Current Outpatient Medications on File Prior to Visit   Medication Sig Dispense Refill    acitretin (Soriatane) 25 mg capsule Take 1 pill by mouth daily with food. 30 capsule 11    acyclovir (Zovirax) 800 mg tablet Take 1 tablet (800 mg) by mouth 2 times a day. For prophylaxis 60 tablet 11    aluminum sulfate-calcium acetate ([place]) 952-1,347 mg packet Apply topically.      azelaic acid (Finacea) 15 % gel Use on face up to twice daily 60 g 11    clobetasol (Temovate) 0.05 % external solution 3-4 drops to affected ear BID x 7 days, then use as needed for itching for 3 days 50 mL 11    clobetasol (Temovate) 0.05 % ointment 1 Application twice daily x 2 weeks then as needed for flares 60 g 11     No current facility-administered medications on file prior to visit.

## 2025-07-17 NOTE — ASSESSMENT & PLAN NOTE
- See above, DEXA in 2022 with T-score as low as -2.0  -Will reassess at this time with repeat DEXA scan  -TSH ordered as well, total vitamin D levels ordered  -Patient endorses taking daily vitamin D since 2022  Orders:    XR DEXA bone density; Future    TSH with reflex to Free T4 if abnormal; Future    Vitamin D 25-Hydroxy,Total (for eval of Vitamin D levels); Future

## 2025-07-22 ENCOUNTER — HOSPITAL ENCOUNTER (OUTPATIENT)
Dept: RADIOLOGY | Facility: CLINIC | Age: 55
Discharge: HOME | End: 2025-07-22
Payer: COMMERCIAL

## 2025-07-22 DIAGNOSIS — M15.0 PRIMARY OSTEOARTHRITIS INVOLVING MULTIPLE JOINTS: ICD-10-CM

## 2025-07-22 DIAGNOSIS — Z78.0 ASYMPTOMATIC MENOPAUSAL STATE: ICD-10-CM

## 2025-07-22 DIAGNOSIS — M85.89 OSTEOPENIA OF MULTIPLE SITES: ICD-10-CM

## 2025-07-22 DIAGNOSIS — M25.50 DIFFUSE ARTHRALGIA: ICD-10-CM

## 2025-07-22 PROCEDURE — 73564 X-RAY EXAM KNEE 4 OR MORE: CPT | Mod: RT

## 2025-07-22 PROCEDURE — 73564 X-RAY EXAM KNEE 4 OR MORE: CPT | Mod: RIGHT SIDE | Performed by: RADIOLOGY

## 2025-07-22 PROCEDURE — 77080 DXA BONE DENSITY AXIAL: CPT | Performed by: STUDENT IN AN ORGANIZED HEALTH CARE EDUCATION/TRAINING PROGRAM

## 2025-07-22 PROCEDURE — 77080 DXA BONE DENSITY AXIAL: CPT

## 2025-07-23 LAB
25(OH)D3+25(OH)D2 SERPL-MCNC: >150 NG/ML (ref 30–100)
ALBUMIN SERPL-MCNC: 4.3 G/DL (ref 3.6–5.1)
ALP SERPL-CCNC: 75 U/L (ref 37–153)
ALT SERPL-CCNC: 10 U/L (ref 6–29)
ALT SERPL-CCNC: 10 U/L (ref 6–29)
ANION GAP SERPL CALCULATED.4IONS-SCNC: 8 MMOL/L (CALC) (ref 7–17)
AST SERPL-CCNC: 12 U/L (ref 10–35)
AST SERPL-CCNC: 13 U/L (ref 10–35)
BILIRUB SERPL-MCNC: 0.4 MG/DL (ref 0.2–1.2)
BUN SERPL-MCNC: 12 MG/DL (ref 7–25)
CALCIUM SERPL-MCNC: 9.2 MG/DL (ref 8.6–10.4)
CHLORIDE SERPL-SCNC: 104 MMOL/L (ref 98–110)
CHOLEST SERPL-MCNC: 265 MG/DL
CHOLEST/HDLC SERPL: 5.4 (CALC)
CO2 SERPL-SCNC: 27 MMOL/L (ref 20–32)
CREAT SERPL-MCNC: 0.7 MG/DL (ref 0.5–1.03)
CRP SERPL-MCNC: <3 MG/L
EGFRCR SERPLBLD CKD-EPI 2021: 101 ML/MIN/1.73M2
ERYTHROCYTE [DISTWIDTH] IN BLOOD BY AUTOMATED COUNT: 13.9 % (ref 11–15)
ERYTHROCYTE [DISTWIDTH] IN BLOOD BY AUTOMATED COUNT: 14.1 % (ref 11–15)
ERYTHROCYTE [SEDIMENTATION RATE] IN BLOOD BY WESTERGREN METHOD: 9 MM/H
GLUCOSE SERPL-MCNC: 96 MG/DL (ref 65–99)
HCT VFR BLD AUTO: 43.2 % (ref 35–45)
HCT VFR BLD AUTO: 44.5 % (ref 35–45)
HDLC SERPL-MCNC: 49 MG/DL
HGB BLD-MCNC: 14 G/DL (ref 11.7–15.5)
HGB BLD-MCNC: 14.2 G/DL (ref 11.7–15.5)
LDLC SERPL CALC-MCNC: 187 MG/DL (CALC)
MCH RBC QN AUTO: 29.9 PG (ref 27–33)
MCH RBC QN AUTO: 30.3 PG (ref 27–33)
MCHC RBC AUTO-ENTMCNC: 31.9 G/DL (ref 32–36)
MCHC RBC AUTO-ENTMCNC: 32.4 G/DL (ref 32–36)
MCV RBC AUTO: 93.5 FL (ref 80–100)
MCV RBC AUTO: 93.7 FL (ref 80–100)
NONHDLC SERPL-MCNC: 216 MG/DL (CALC)
PLATELET # BLD AUTO: 267 THOUSAND/UL (ref 140–400)
PLATELET # BLD AUTO: 271 THOUSAND/UL (ref 140–400)
PMV BLD REES-ECKER: 10.9 FL (ref 7.5–12.5)
PMV BLD REES-ECKER: 11.1 FL (ref 7.5–12.5)
POTASSIUM SERPL-SCNC: 4.2 MMOL/L (ref 3.5–5.3)
PROT SERPL-MCNC: 6.9 G/DL (ref 6.1–8.1)
RBC # BLD AUTO: 4.62 MILLION/UL (ref 3.8–5.1)
RBC # BLD AUTO: 4.75 MILLION/UL (ref 3.8–5.1)
SODIUM SERPL-SCNC: 139 MMOL/L (ref 135–146)
TRIGL SERPL-MCNC: 149 MG/DL
TRIGL SERPL-MCNC: 150 MG/DL
TSH SERPL-ACNC: 0.74 MIU/L (ref 0.4–4.5)
WBC # BLD AUTO: 10 THOUSAND/UL (ref 3.8–10.8)
WBC # BLD AUTO: 10.5 THOUSAND/UL (ref 3.8–10.8)

## 2025-07-25 DIAGNOSIS — Q82.8 HAILEY-HAILEY DISEASE: ICD-10-CM

## 2025-07-25 DIAGNOSIS — Z79.899 OTHER LONG TERM (CURRENT) DRUG THERAPY: ICD-10-CM

## 2025-08-22 DIAGNOSIS — Z79.899 OTHER LONG TERM (CURRENT) DRUG THERAPY: ICD-10-CM

## 2025-08-22 DIAGNOSIS — Q82.8 HAILEY-HAILEY DISEASE: ICD-10-CM

## 2025-08-26 ENCOUNTER — OFFICE VISIT (OUTPATIENT)
Age: 55
End: 2025-08-26
Payer: COMMERCIAL

## 2025-08-26 VITALS
WEIGHT: 160 LBS | HEIGHT: 66 IN | TEMPERATURE: 98.4 F | SYSTOLIC BLOOD PRESSURE: 121 MMHG | OXYGEN SATURATION: 98 % | RESPIRATION RATE: 16 BRPM | BODY MASS INDEX: 25.71 KG/M2 | HEART RATE: 72 BPM | DIASTOLIC BLOOD PRESSURE: 79 MMHG

## 2025-08-26 DIAGNOSIS — H66.003 NON-RECURRENT ACUTE SUPPURATIVE OTITIS MEDIA OF BOTH EARS WITHOUT SPONTANEOUS RUPTURE OF TYMPANIC MEMBRANES: ICD-10-CM

## 2025-08-26 DIAGNOSIS — J01.00 ACUTE NON-RECURRENT MAXILLARY SINUSITIS: Primary | ICD-10-CM

## 2025-08-26 RX ORDER — AMOXICILLIN AND CLAVULANATE POTASSIUM 875; 125 MG/1; MG/1
875 TABLET, FILM COATED ORAL 2 TIMES DAILY
Qty: 20 TABLET | Refills: 0 | Status: SHIPPED | OUTPATIENT
Start: 2025-08-26

## 2025-08-26 RX ORDER — BROMPHENIRAMINE MALEATE, PSEUDOEPHEDRINE HYDROCHLORIDE, AND DEXTROMETHORPHAN HYDROBROMIDE 2; 30; 10 MG/5ML; MG/5ML; MG/5ML
10 SYRUP ORAL 4 TIMES DAILY PRN
Qty: 200 ML | Refills: 0 | Status: SHIPPED | OUTPATIENT
Start: 2025-08-26 | End: 2025-09-05

## 2025-08-26 RX ORDER — PREDNISONE 20 MG/1
20 TABLET ORAL DAILY
Qty: 5 TABLET | Refills: 0 | Status: SHIPPED | OUTPATIENT
Start: 2025-08-26 | End: 2025-08-31

## 2025-08-26 ASSESSMENT — ENCOUNTER SYMPTOMS
SORE THROAT: 1
COUGH: 1

## 2025-08-27 ENCOUNTER — TELEPHONE (OUTPATIENT)
Age: 55
End: 2025-08-27

## 2025-09-18 ENCOUNTER — APPOINTMENT (OUTPATIENT)
Dept: PRIMARY CARE | Facility: CLINIC | Age: 55
End: 2025-09-18
Payer: COMMERCIAL

## 2026-02-18 ENCOUNTER — APPOINTMENT (OUTPATIENT)
Dept: DERMATOLOGY | Facility: CLINIC | Age: 56
End: 2026-02-18
Payer: COMMERCIAL